# Patient Record
Sex: MALE | Race: WHITE | NOT HISPANIC OR LATINO | Employment: FULL TIME
[De-identification: names, ages, dates, MRNs, and addresses within clinical notes are randomized per-mention and may not be internally consistent; named-entity substitution may affect disease eponyms.]

---

## 2022-11-08 ENCOUNTER — HOSPITAL ENCOUNTER (OUTPATIENT)
Age: 27
End: 2022-11-08
Attending: STUDENT IN AN ORGANIZED HEALTH CARE EDUCATION/TRAINING PROGRAM
Payer: COMMERCIAL

## 2022-11-08 NOTE — PROGRESS NOTES
"[facility]  Transfer Triage Note    Date of call: 11/08/22  Time of call: 3:21 PM    Current Patient Location: 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 No  Reason for requested transfer: Further diagnostic work up, management, and consultation for specialized care   Isolation Needs: Contact    Outside Records: Available  Additional records may be faxed to [phone].    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 Seen initially for back pain - MRI of spine without sign of discitis or abscess, although radiologist relayed to hospitalist that it may be too early to see sequlae. Has present janeway lesions and initial TEE concerning for septic emboli. No visual changes. Also growing MRSA in urine. On vanc currently. EF on TTE WNL. Has UDS positive for cocaine, but no other information regarding drug use or administration of use.    Accepted to [facility] for medicine admission with ID and CVTS evaluation.     Xiao Lee MD      "